# Patient Record
Sex: FEMALE | Race: WHITE | URBAN - METROPOLITAN AREA
[De-identification: names, ages, dates, MRNs, and addresses within clinical notes are randomized per-mention and may not be internally consistent; named-entity substitution may affect disease eponyms.]

---

## 2022-01-21 ENCOUNTER — OFFICE VISIT (OUTPATIENT)
Dept: OBGYN CLINIC | Facility: CLINIC | Age: 37
End: 2022-01-21
Payer: COMMERCIAL

## 2022-01-21 VITALS — DIASTOLIC BLOOD PRESSURE: 82 MMHG | SYSTOLIC BLOOD PRESSURE: 126 MMHG

## 2022-01-21 DIAGNOSIS — Z30.09 STERILIZATION CONSULT: Primary | ICD-10-CM

## 2022-01-21 PROCEDURE — 3079F DIAST BP 80-89 MM HG: CPT | Performed by: OBSTETRICS & GYNECOLOGY

## 2022-01-21 PROCEDURE — 99203 OFFICE O/P NEW LOW 30 MIN: CPT | Performed by: OBSTETRICS & GYNECOLOGY

## 2022-01-21 PROCEDURE — 3074F SYST BP LT 130 MM HG: CPT | Performed by: OBSTETRICS & GYNECOLOGY

## 2022-01-21 NOTE — TELEPHONE ENCOUNTER
Please schedule the following surgery:    Procedure: operative laparoscopy/ right and left salpingectomy   Assist: (Y/N or none) yes, request yuli monica  Date: coordinate with pt  Dx: voluntary permanent sterilization  Pre-op appt: (Y/N or n/a)no  Admission type: (IN/OUT/OBVS)outpt  Department of discharge(SDS/Floor):  Expected length of stay: outpt  Procedure length time (please enter amount you are requesting):1 hour  Recovery time (patients always ask):  Medical Clearance: (Y/N)no      ALL Medicaid/including BCBS community: Tubal/ Hyst form MUST be signed (30 days):     COVID19 Lab 5062 needs to be placed for all C-sections, IOL & Versions     Message to nurses:

## 2022-01-21 NOTE — PROGRESS NOTES
HPI:   Ty Anthony is a 29year old female who presents for a consult for voluntary permanent sterilization. Pt counseled jony. Pt has a hx of  X 1, pt is sure she no longer wants more kids.      Patient counseled on laparoscopic bilateral height or weight on file to calculate BMI.    GENERAL: well developed, well nourished,in no apparent distress  SKIN: no rashes,no suspicious lesions  HEENT: atraumatic, normocephalic  EYES:normal in appearance    MUSCULOSKELETAL: back is not tender,FROM of

## 2022-01-28 NOTE — TELEPHONE ENCOUNTER
Called and spoke with pt. I asked pt if she would like me to call back with a . Pt declined and asked that I speak with her . Spoke to her  and they agreed to the surgery date and time.  Minor case letter mailed to pt's home address.     -assist pending

## 2022-02-24 NOTE — ANESTHESIA PROCEDURE NOTES
Airway  Date/Time: 2/24/2022 9:32 AM  Urgency: Elective    Airway not difficult    General Information and Staff    Patient location during procedure: OR  Anesthesiologist: Dann Rosales MD  Resident/CRNA: Xochitl Batista CRNA  Performed: anesthesiologist     Indications and Patient Condition  Indications for airway management: anesthesia  Spontaneous Ventilation: absent  Sedation level: deep  Preoxygenated: yes  Patient position: sniffing  Mask difficulty assessment: 1 - vent by mask    Final Airway Details  Final airway type: endotracheal airway      Successful airway: ETT  Cuffed: yes   Successful intubation technique: direct laryngoscopy  Facilitating devices/methods: cricoid pressure  Endotracheal tube insertion site: oral  Blade: Tiff  Blade size: #3  ETT size (mm): 7.0    Cormack-Lehane Classification: grade I - full view of glottis  Placement verified by: chest auscultation and capnometry   Measured from: teeth  ETT to teeth (cm): 22  Number of attempts at approach: 1  Number of other approaches attempted: 0    Additional Comments  Intubated easily on first attempt, no dental damage

## 2022-02-24 NOTE — OPERATIVE REPORT
[facility]    Post-Op Progress Note    [address] Patient Status:  Hospital Outpatient Surgery    10/12/1985 MRN [id number]   Location [facility] POST ANESTHESIA CARE UNIT Attending Jos Del Valle MD   Hosp Day # 0 PCP No primary care provider on file. Preoperative Diagnosis: Request for sterilization [Z30.2]  Postoperative Diagnosis: Request for sterilization [Z30.2]  Procedure  Operative laparoscopy, bilateral salpingectomy, mirena iud removal.  Lysis of adnesions. Primary surgeon: [unfilled]    Surgical Findings: fallopian tubes adhered with filmy adhesions to  Posterior cul de sac and ovaries, lysis of adhesions done. Anesthesia: General   Complications: None; patient tolerated the procedure well. Specimen: * No order type specified *  Drains: none  Condition: doing well without problems  Estimated blood loss: Minimal    Comments:      Matt Suarez MD  2022  11:12 AM

## 2022-02-25 NOTE — OPERATIVE REPORT
[facility]    PATIENT'S NAME: Maksim RUSHING   ATTENDING PHYSICIAN: Matt Rosen MD   OPERATING PHYSICIAN: Nestor Rosen MD   PATIENT ACCOUNT#:   [de-identified]    LOCATION:  [address]  MEDICAL RECORD #:   [id number]       YOB: 1985  ADMISSION DATE:       02/24/2022      OPERATION DATE:  02/24/2022    OPERATIVE REPORT      PREOPERATIVE DIAGNOSIS:    1.   Voluntary permanent sterilization. 2.   Removal of Mirena IUD. POSTOPERATIVE DIAGNOSIS:    1.   Voluntary permanent sterilization. 2.   Removal of Mirena IUD. PROCEDURE:  Operative laparoscopy, bilateral salpingectomy, and Mirena intrauterine device removal.      ASSISTANT:  BARRIE Marmolejo     ANESTHESIA:  General endotracheal.    ESTIMATED BLOOD LOSS:  Less than 5 mL. COMPLICATIONS:  None. CONDITION:  The patient tolerated the procedure well and was taken to [facility] in good condition. INDICATIONS:  The patient is a 64-year-old female who requested voluntary permanent sterilization via operative laparoscopy, right and left salpingectomy, as well as Mirena IUD removal.  The patient was counseled on risks, benefits, and alternatives of procedure including risk of bleeding; infection; damage to internal organs (including bowel, bladder, uterus, ovaries, tubes, cervix, vagina, ureters, blood vessels, among other organs); risk of anesthesia; risk of thromboembolic disease such as DVT, PE, MI, stroke; risk of hemorrhage and blood transfusion; among other risks. The patient voiced understanding of all the above, and all questions were answered. Preoperative pregnancy test was negative. FINDINGS:  Fallopian tubes noted to be adhered to the posterior cul-de-sac and ovaries bilaterally with filmy adhesions, with lysis of adhesion performed. Total time for lysis of adhesions was approximately 15 minutes.     OPERATIVE TECHNIQUE:  Patient was taken to the operating room, and after induction of general endotracheal anesthesia, the patient was prepped and draped in usual sterile fashion. Noyola catheter was placed in the bladder for drainage. Sequential compression devices were placed on lower extremities, were operative prior to the procedure, throughout the procedure, and postop as well for DVT prophylaxis. Exam under anesthesia was performed, and the weighted speculum was placed in the vagina. A single-tooth tenaculum was placed on the anterior lip of the cervix. The IUD string was visualized, and the Mirena IUD was removed in the usual fashion and sent to Pathology for gross only. The uterus was sounded, after which the HUMI uterine manipulator was inserted without difficulty in the usual fashion. The single-tooth tenaculum was removed from the anterior lip of the cervix, and excellent hemostasis was assured. Attention was turned to the abdomen. A knife was used to perform a 5 mm skin incision in the umbilicus through which the Veress needle was inserted in the usual fashion. A water drop test revealed proper placement of Veress needle. The abdomen was fully insufflated, after which the Veress needle was removed. The 5 mm bladeless disposable laparoscopic trocar was then inserted in the umbilical incision, followed by the 5 mm camera as well, and the right and left lower quadrants were transilluminated, after which the 5 mm incisions were performed in the right and left lower quadrants through which 5 mm bladeless disposable laparoscopic trocars were inserted in the usual fashion under direct visualization with the camera. The patient was placed in Trendelenburg position at the beginning of the procedure. The right and left fallopian tubes were noted to be partially adhered to the posterior cul-de-sac, as well as to the ovaries bilaterally.   Lysis of adhesions was then performed of the left and right fallopian tubes taking great care to avoid the surrounding organs and staying close to the fallopian tubes and ovaries only. Total lysis of adhesion time was approximately 15 to 20 minutes. The left fallopian tube was then elevated and identified to the fimbria in its entirety. The EnSeal bipolar device was used to excise the left fallopian tube in its entirety, removed, and sent to Pathology. Great care was taken to avoid the surrounding organs. The same procedure was performed on the right side. The right fallopian tube was elevated and identified to its fimbria, and the right fallopian tube was excised in its entirety, removed, and sent to Pathology. Excellent hemostasis was noted bilaterally. Great care was taken to avoid the surrounding organs as noted above. At this time, excellent hemostasis was noted. The right and left trocars were removed under direct visualization. Excellent hemostasis was noted. The abdomen was then partially desufflated with the camera visualizing the pelvis, and once again excellent hemostasis was noted at this time. The camera was then withdrawn, and the abdomen was fully desufflated. A 4-0 Vicryl suture was used to close the incisions. A total of 10 mL of 0.25% Marcaine was injected around the incisions as well for postop analgesia. Dermabond was placed over the incisions as well. All counts were correct, and excellent hemostasis noted. The HUMI uterine manipulator was deflated and removed from the uterus as well, and excellent hemostasis noted. The Noyola catheter was deflated and removed as well. All counts were correct. The patient tolerated the procedure well and was taken to [facility] in good condition. Dictated By Matilda Leonard MD  d: 02/24/2022 19:17:56  t: 02/24/2022 23:50:52  [facility] [id number]  [facility]/

## 2023-02-09 NOTE — TELEPHONE ENCOUNTER
I left a message on patient's VM with [facility] appointment date, time, arrival time, where to park and prep instructions in [address] [facility]. I also gave her our ph# if any questions.

## 2023-02-28 NOTE — TELEPHONE ENCOUNTER
Called both listed phone numbers with interpretor, no response. Message left to call back to discuss MRCP results on patient's mobile line. Joey Brizuela PA-C  Department of [address] [place] [address]  [address] [facility] [place] [address] Sami, [address] [facility]  T: [phone]  F: [phone]

## (undated) NOTE — LETTER
MINOR CASE LETTER      1/28/2022        Dear Afsaneh Lopez are having a Operative Laparoscopy/Right and Left Salpingectomy on Thursday, 02/24/2022 at 02:00pm. You are to arrive 2 hours prior, which would be 12:00pm.    Do not eat or drink anything (including water) after midnight the night before surgery. If your procedure is scheduled later in the day, then nothing by mouth for 6 hours before arrival to the hospital.    Lukasz Pate are to call this office if you have any cold or flu symptoms 2 days before your scheduled surgery. Please avoid aspirin 7 days before surgery. Avoid Ibuprofen, Motrin, Aleve, or Naprosyn for 3 days before surgery. You will be contacted by PreAdmission Testing (PAT) usually within the week before surgery. They will take a short medical history and let you know if any preoperative testing is needed. Contact your insurance company to let them know you are having a [id number] done. Call our office if any pre-certification or pre-authorization is required. If you have an HMO or EPO insurance, we will initiate the referral for you. Please make arrangements for someone to drive you home after your surgery. Please feel free to contact our office at [phone] if you have any questions regarding these instructions or your procedure. Sincerely,          Kerri Henry.  Kiana Reyes MD  [address] [place] [address], [address] [place] [address] [place] [address]  [phone]

## (undated) NOTE — LETTER
[facility]  Administration of Anesthesia  1. I, Gris Lackey, or _________________________________ acting on her behalf, (Patient) (Dependent/Representative) request to receive anesthesia for my pending procedure/operation/treatment. A physician (anesthesiologist) alone or an anesthesiologist working with a nurse anesthetist may administer my anesthesia. 2. I understand that my anesthesiologist is not an employee or agent of the hospital, but is an independent medical practitioner who has been permitted to use its facilities for the care and treatment of his/her patients. 3. I acknowledge that a physician from [facility] or their designate(s), recommended anesthesia for me using her/his medical judgment. The type(s) of anesthesia I may receive include:                a) General Anesthesia, b) Spinal/Epidural Anesthesia, c) Regional Anesthesia or d) Monitored Anesthesia Care. 4. If my spinal, regional or monitored anesthesia care (local) is not satisfactory for my comfort, or if my medical condition requires, I consent to the administration of general anesthesia. 5. I am aware that the practice of anesthesiology is not an exact science and that some foreseeable risks or consequences may occur. Some common risks/consequences include sore throat and hoarseness, nausea and vomiting, muscle soreness, backache, damage to the mouth/teeth/vocal cords and eye injury. I understand that more rare but serious potential risks of anesthesia include blood pressure changes, drug reactions, cardiac arrest, brain damage, paralysis or death. These risks apply to whether I have general, spinal/epidural, regional or monitored anesthesia care. 6. OBSTETRIC PATIENTS: Specific risks/consequences of spinal/epidural anesthesia may include itching, low blood pressure, difficulty urinating, slowing of the baby's heart rate and headache.  Rare risks include infections, high spinal block, spinal bleeding, seizure, cardiac arrest and death. 7. AWARENESS: I understand that it is possible (but unlikely) to have explicit memory of events from the operating room while under general anesthesia. 8. ELECTROCONVULSIVE THERAPY PATIENTS: This consent serves for all treatments in a single course of therapy. 9. I understand that I must inform my anesthesiologist when I last ate and/or drank to minimize the risk of anesthesia. 10. If I am pregnant, or may pregnant, I understand that elective surgery should be postponed until after the baby is born. Anesthetics cross the placenta and may temporarily anesthetize the baby. Although fetal complications of anesthesia during pregnancy are rare, they may include birth defects, premature labor, brain damage and death. 11. I certify that I informed the anesthesiologist, to the best of my ability, about medication I take including blood thinners, anticoagulants, herbal remedies, narcotics and recreational drugs (e.g. cocaine, marijuana, PCP). Failure to inform my anesthesiologist about these medicines may increase my risk of anesthetic complications. The nature and purpose of my anesthetic management was explained to me. I had the opportunity to ask questions and the answers and information provided meet my satisfaction.   I retain the right to withdraw this consent at any time prior to the administration of said anesthetic.    ___________________________________________________           _____________________________________________________  Patient Signature                                                                                      Witness Signature                ___________________________________________________           _____________________________________________________  Date/Time                                                                                               Responsible person in case of minor/ unconscious pt /Relationship    My signature below affirms that prior to the time of the procedure, I have explained to the patient and/or his/her guardian, the risks and benefits of undergoing anesthesia, as well as any reasonable alternatives.     ___________________________________________________            _____________________________________________________  Physician Signature                            Date/Time  Patient Name: Mariela Cortes     : 10/12/1985     Printed: 2022      Medical Record #: [id number]                              Page 1 of 1    ----------ANESTHESIA CONSENT----------

## (undated) NOTE — LETTER
[address] [place], [place]      Authorization for Surgical Operation and Procedure     Date:___________                                                                                                         Time:__________  1. I hereby Yelitza Dietrich MD, my physician and his/her assistants (if applicable), which may include medical students, residents, and/or fellows, to perform the following surgical operation/ procedure and administer such anesthesia as may be determined necessary by my physician:  Operation/Procedure name (s) operative laparoscopy, right and left salpingectomy;  IUD removal  on [address]   2. I recognize that during the surgical operation/procedure, unforeseen conditions may necessitate additional or different procedures than those listed above. I, therefore, further authorize and request that the above-named surgeon, assistants, or designees perform such procedures as are, in their judgment, necessary and desirable. 3.   My surgeon/physician has discussed prior to my surgery the potential benefits, risks and side effects of this procedure; the likelihood of achieving goals; and potential problems that might occur during recuperation. They also discussed reasonable alternatives to the procedure, including risks, benefits, and side effects related to the alternatives and risks related to not receiving this procedure. I have had all my questions answered and I acknowledge that no guarantee has been made as to the result that may be obtained. 4.   Should the need arise during my operation or immediate post-operative period, I also consent to the administration of blood and/or blood products. Further, I understand that despite careful testing and screening of blood or blood products by collecting agencies, I may still be subject to ill effects as a result of receiving a blood transfusion and/or blood products.   The following are some, but not all, of the potential risks that can occur: fever and allergic reactions, hemolytic reactions, transmission of diseases such as Hepatitis, AIDS and Cytomegalovirus (CMV) and fluid overload. In the event that I wish to have an autologous transfusion of my own blood, or a directed donor transfusion. I will discuss this with my physician. 5.   I authorize the use of any specimen, organs, tissues, body parts or foreign objects that may be removed from my body during the operation/procedure for diagnosis, research or teaching purposes and their subsequent disposal by hospital authorities. I also authorize the release of specimen test results and/or written reports to my treating physician on the hospital medical staff or other referring or consulting physicians involved in my care, at the discretion of the Pathologist or my treating physician. 6.   I consent to the photographing or videotaping of the operations or procedures to be performed, including appropriate portions of my body for medical, scientific, or educational purposes, provided my identity is not revealed by the pictures or by descriptive texts accompanying them. If the procedure has been photographed/videotaped, the surgeon will obtain the original picture, image, videotape or CD. The hospital will not be responsible for storage, release or maintenance of the picture, image, tape or CD.    7.   I consent to the presence of a  or observers in the operating room as deemed necessary by my physician or their designees. 8.   I recognize that in the event my procedure results in extended X-Ray/fluoroscopy time, I may develop a skin reaction. 9. If I have a Do Not Attempt Resuscitation (DNAR) order in place, that status will be suspended while in the operating room, procedural suite, and during the recovery period unless otherwise explicitly stated by me (or a person authorized to consent on my behalf). The surgeon or my attending physician will determine when the applicable recovery period ends for purposes of reinstating the DNAR order. 10. Patients having a sterilization procedure: I understand that if the procedure is successful the results will be permanent and it will therefore be impossible for me to inseminate, conceive, or bear children. I also understand that the procedure is intended to result in sterility, although the result has not been guaranteed. 11. I acknowledge that my physician has explained sedation/analgesia administration to me including the risk and benefits I consent to the administration of sedation/analgesia as may be necessary or desirable in the judgment of my physician. I CERTIFY THAT I HAVE READ AND FULLY UNDERSTAND THE ABOVE CONSENT TO OPERATION and/or OTHER PROCEDURE.    _________________________________________  __________________________________  Signature of Patient     Signature of Responsible Person         ___________________________________         Printed Name of Responsible Person           _________________________________                  Relationship to Patient  _________________________________________  ______________________________  Signature of Witness          Date  Time    STATEMENT OF PHYSICIAN My signature below affirms that prior to the time of the procedure; I have explained to the patient and/or his/her legal representative, the risks and benefits involved in the proposed treatment and any reasonable alternative to the proposed treatment. I have also explained the risks and benefits involved in refusal of the proposed treatment and alternatives to the proposed treatment and have answered the patient's questions.  If I have a significant financial interest in a co-management agreement or a significant financial interest in any product or implant, or other significant relationship used in this procedure/surgery, I have disclosed this and had a discussion with my patient.     _______________________________________________________________ _____________________________  Trice Grief of Physician)                                                                                         (Date)                                   (Time)        Patient Name: Mariela Cortes    : 10/12/1985   Printed: 2022      Medical Record #: [id number]                                              Page 1 of 1